# Patient Record
Sex: FEMALE | Race: WHITE
[De-identification: names, ages, dates, MRNs, and addresses within clinical notes are randomized per-mention and may not be internally consistent; named-entity substitution may affect disease eponyms.]

---

## 2019-07-06 ENCOUNTER — HOSPITAL ENCOUNTER (EMERGENCY)
Dept: HOSPITAL 26 - MED | Age: 39
Discharge: HOME | End: 2019-07-06
Payer: COMMERCIAL

## 2019-07-06 VITALS — SYSTOLIC BLOOD PRESSURE: 126 MMHG | DIASTOLIC BLOOD PRESSURE: 84 MMHG

## 2019-07-06 VITALS — HEIGHT: 63 IN | BODY MASS INDEX: 30.12 KG/M2 | WEIGHT: 170 LBS

## 2019-07-06 DIAGNOSIS — Z91.011: ICD-10-CM

## 2019-07-06 DIAGNOSIS — Z88.8: ICD-10-CM

## 2019-07-06 DIAGNOSIS — Z79.899: ICD-10-CM

## 2019-07-06 DIAGNOSIS — B02.9: Primary | ICD-10-CM

## 2019-07-06 DIAGNOSIS — Z88.6: ICD-10-CM

## 2019-07-06 DIAGNOSIS — Z91.018: ICD-10-CM

## 2019-07-06 NOTE — NUR
PT PRESENTED TO ED WITH RASH ON BACK.  APPROX 3CM ROUND. PT STATES THAT SHE 
SLEPT ON A QUARTER AND BELIEVES THAT SHE MAY BE HAVING AN ALLERGIC REACTION TO 
THE QUARTER.  AT BED AWAITING MD ASSESSMENT.

## 2019-07-06 NOTE — NUR
Patient discharged with v/s stable. Written and verbal after care instructions 
given and explained. 

Patient alert, oriented and verbalized understanding of instructions. 
Ambulatory with steady gait. All questions addressed prior to discharge. ID 
band removed. Patient advised to follow up with PMD. Rx of ACYCLOVIR, NORCO 
given. Patient educated on indication of medication including possible reaction 
and side effects. Opportunity to ask questions provided and answered.

## 2019-08-25 ENCOUNTER — HOSPITAL ENCOUNTER (EMERGENCY)
Dept: HOSPITAL 26 - MED | Age: 39
LOS: 1 days | Discharge: HOME | End: 2019-08-26
Payer: COMMERCIAL

## 2019-08-25 VITALS — HEIGHT: 63 IN | BODY MASS INDEX: 31.18 KG/M2 | WEIGHT: 176 LBS

## 2019-08-25 VITALS — SYSTOLIC BLOOD PRESSURE: 142 MMHG | DIASTOLIC BLOOD PRESSURE: 65 MMHG

## 2019-08-25 DIAGNOSIS — Z88.8: ICD-10-CM

## 2019-08-25 DIAGNOSIS — I31.9: ICD-10-CM

## 2019-08-25 DIAGNOSIS — Z79.899: ICD-10-CM

## 2019-08-25 DIAGNOSIS — Z91.012: ICD-10-CM

## 2019-08-25 DIAGNOSIS — Z88.6: ICD-10-CM

## 2019-08-25 DIAGNOSIS — Z91.011: ICD-10-CM

## 2019-08-25 DIAGNOSIS — J06.9: ICD-10-CM

## 2019-08-25 DIAGNOSIS — Z88.5: ICD-10-CM

## 2019-08-25 DIAGNOSIS — N39.0: Primary | ICD-10-CM

## 2019-08-25 LAB
ALBUMIN FLD-MCNC: 3.6 G/DL (ref 3.4–5)
ANION GAP SERPL CALCULATED.3IONS-SCNC: 12.7 MMOL/L (ref 8–16)
APPEARANCE UR: CLEAR
AST SERPL-CCNC: 12 U/L (ref 15–37)
BASOPHILS # BLD AUTO: 0 K/UL (ref 0–0.22)
BASOPHILS NFR BLD AUTO: 0.7 % (ref 0–2)
BILIRUB SERPL-MCNC: 0.2 MG/DL (ref 0–1)
BILIRUB UR QL STRIP: NEGATIVE
BUN SERPL-MCNC: 9 MG/DL (ref 7–18)
CHLORIDE SERPL-SCNC: 105 MMOL/L (ref 98–107)
CO2 SERPL-SCNC: 29.2 MMOL/L (ref 21–32)
COLOR UR: YELLOW
CREAT SERPL-MCNC: 0.8 MG/DL (ref 0.6–1.3)
EOSINOPHIL # BLD AUTO: 0.2 K/UL (ref 0–0.4)
EOSINOPHIL NFR BLD AUTO: 2.8 % (ref 0–4)
ERYTHROCYTE [DISTWIDTH] IN BLOOD BY AUTOMATED COUNT: 17.1 % (ref 11.6–13.7)
GFR SERPL CREATININE-BSD FRML MDRD: 104 ML/MIN (ref 90–?)
GLUCOSE SERPL-MCNC: 96 MG/DL (ref 74–106)
GLUCOSE UR STRIP-MCNC: NEGATIVE MG/DL
HCT VFR BLD AUTO: 35.4 % (ref 36–48)
HGB BLD-MCNC: 11.4 G/DL (ref 12–16)
HGB UR QL STRIP: NEGATIVE
LEUKOCYTE ESTERASE UR QL STRIP: (no result)
LIPASE SERPL-CCNC: 193 U/L (ref 73–393)
LYMPHOCYTES # BLD AUTO: 2.4 K/UL (ref 2.5–16.5)
LYMPHOCYTES NFR BLD AUTO: 42.5 % (ref 20.5–51.1)
MCH RBC QN AUTO: 25 PG (ref 27–31)
MCHC RBC AUTO-ENTMCNC: 32 G/DL (ref 33–37)
MCV RBC AUTO: 78.3 FL (ref 80–94)
MONOCYTES # BLD AUTO: 0.6 K/UL (ref 0.8–1)
MONOCYTES NFR BLD AUTO: 10.5 % (ref 1.7–9.3)
NEUTROPHILS # BLD AUTO: 2.4 K/UL (ref 1.8–7.7)
NEUTROPHILS NFR BLD AUTO: 43.5 % (ref 42.2–75.2)
NITRITE UR QL STRIP: NEGATIVE
PH UR STRIP: 6 [PH] (ref 5–9)
PLATELET # BLD AUTO: 369 K/UL (ref 140–450)
POTASSIUM SERPL-SCNC: 3.9 MMOL/L (ref 3.5–5.1)
RBC # BLD AUTO: 4.52 MIL/UL (ref 4.2–5.4)
RBC #/AREA URNS HPF: (no result) /HPF (ref 0–5)
SODIUM SERPL-SCNC: 143 MMOL/L (ref 136–145)
WBC # BLD AUTO: 5.6 K/UL (ref 4.8–10.8)
WBC,URINE: (no result) /HPF (ref 0–5)

## 2019-08-25 NOTE — NUR
C/O NECK PAIN 5/10 THAT STARTED LAST FRIDAY. PAIN RADIATES TO SHOULDERS.PAIN IS 
DESCRIBED AS A BURNING SENSATION. VSS. COUGH IS NONPRODUCTIVE. PT.STATES THAT 
SHE TOOK A TYLENOL FOR PAIN AND MUCINEX FOR CONGESTION WHICH ALLEVIATES 
SYMPTOMS TEMPORARILY. ER MD TO SEE PATIENT. SIDERAILS X2.



PMH: GALLSTONES

ALLERGIES TO ASPRING; FLEXERIL; TRAMADOL

RX:NONE

## 2019-08-26 VITALS — DIASTOLIC BLOOD PRESSURE: 76 MMHG | SYSTOLIC BLOOD PRESSURE: 126 MMHG

## 2019-08-26 NOTE — NUR
Patient discharged with v/s stable. Written and verbal after care instructions 
given and explained. 

Patient alert, oriented and verbalized understanding of instructions. 
Ambulatory with steady gait. All questions addressed prior to discharge. ID 
band removed. Patient advised to follow up with PMD. Rx of KEFLEX 500MG given. 
Patient educated on indication of medication including possible reaction and 
side effects. Opportunity to ask questions provided and answered.